# Patient Record
(demographics unavailable — no encounter records)

---

## 2024-11-27 NOTE — ASSESSMENT
[FreeTextEntry1] : Patient with primary hyperparathyroidism and osteopenia with worsening symptoms.  I have recommended a parathyroidectomy with intraoperative PTH monitoring.  I have requested a 4-dimensional CT scan to assist in preoperative localization. I have reviewed the pathophysiology of the disease process, the area anatomy and the rationale for surgery.  I discussed the risks, benefits and alternative treatments which include but are not limited to bleeding, infection, numbness, hoarseness, hypocalcemia, scarring, and need for reoperation.  I have answered the patient's questions to their satisfaction.  The patient wishes to proceed with the recommended procedure possibly during February.  They will contact my office to schedule surgery.

## 2024-11-27 NOTE — HISTORY OF PRESENT ILLNESS
[de-identified] : Patient referred by Dr. Martinez for evaluation of primary hyperparathyroidism.  9 months ago patient noted to have elevated calcium.  Reports reflux with increased urination, constipation, fatigue, increased forgetfulness, heat intolerance, anxiety, joint pain and voice fatigue.  Patient denies kidney stones, hypertension, dysphagia, radiation exposure.  Calcium 11, , creatinine 0.9, TSH 0.09, free T4 1.6, 24-hour urine calcium 217.  Bone density January 2023: Spine T1.4, hip T- 1.5.  Sestamibi November 2024: Activity left lower thyroid gland.  Thyroid ultrasound November 2024: No thyroid nodules, no parathyroid visualized. I have reviewed all old and new data and available images.

## 2024-11-27 NOTE — PHYSICAL EXAM
[de-identified] : no cervical or supraclavicular adenopathy, trachea midline, thyroid without enlargement or palpable mass [Normal] : no neck adenopathy [de-identified] : Skin:  normal appearance.  no rash, nodules, vesicles, or erythema, Musculoskeletal:  full range of motion and no deformities appreciated Neurological:  grossly intact Psychiatric:  oriented to person, place and time with appropriate affect

## 2024-11-27 NOTE — CONSULT LETTER
[Dear  ___] : Dear  [unfilled], [Consult Letter:] : I had the pleasure of evaluating your patient, [unfilled]. [Please see my note below.] : Please see my note below. [Consult Closing:] : Thank you very much for allowing me to participate in the care of this patient.  If you have any questions, please do not hesitate to contact me. [Sincerely,] : Sincerely, [FreeTextEntry3] : Shannan Lange MD, FACS Assistant Professor of Surgery and Otolaryngology [facility] [DrCrystal  ___] : Dr. SALAZAR

## 2025-03-20 NOTE — HISTORY OF PRESENT ILLNESS
[de-identified] : Patient referred by Dr. Martinez for evaluation of primary hyperparathyroidism.  9 months ago patient noted to have elevated calcium.  Reports reflux with increased urination, constipation, fatigue, increased forgetfulness, heat intolerance, anxiety, joint pain and voice fatigue.  Patient denies kidney stones, hypertension, dysphagia, radiation exposure.  Calcium 11, , creatinine 0.9, TSH 0.09, free T4 1.6, 24-hour urine calcium 217.  Bone density January 2023: Spine T1.4, hip T- 1.5.  Sestamibi November 2024: Activity left lower thyroid gland.  Thyroid ultrasound November 2024: No thyroid nodules, no parathyroid visualized.  3/12/25 left superior parathyroidectomy.  path benign, Patient denies dysphagia, hoarseness, pain or parathesias.I have reviewed all old and new data and available images.

## 2025-03-20 NOTE — PHYSICAL EXAM
[de-identified] : Incision healing with min swelling, scar min discussed. no cervical or supraclavicular adenopathy, trachea midline, thyroid without enlargement or palpable mass [Normal] : no neck adenopathy [de-identified] : Skin:  normal appearance.  no rash, nodules, vesicles, or erythema, Musculoskeletal:  full range of motion and no deformities appreciated Neurological:  grossly intact Psychiatric:  oriented to person, place and time with appropriate affect

## 2025-03-20 NOTE — ASSESSMENT
[FreeTextEntry1] : Patient with primary hyperparathyroidism and osteopenia with worsening symptoms.  Patient doing well postop.  Blood work sent.  Patient will decrease calcium to 1000 daily.  RTO 6 weeks. I have answered their questions to the best of my ability.

## 2025-03-27 NOTE — HISTORY OF PRESENT ILLNESS
[de-identified] : Patient referred by Dr. Martinez for evaluation of primary hyperparathyroidism.  9 months ago patient noted to have elevated calcium.  Reports reflux with increased urination, constipation, fatigue, increased forgetfulness, heat intolerance, anxiety, joint pain and voice fatigue.  Patient denies kidney stones, hypertension, dysphagia, radiation exposure.  Calcium 11, , creatinine 0.9, TSH 0.09, free T4 1.6, 24-hour urine calcium 217.  Bone density January 2023: Spine T1.4, hip T- 1.5.  Sestamibi November 2024: Activity left lower thyroid gland.  Thyroid ultrasound November 2024: No thyroid nodules, no parathyroid visualized.  3/12/25 left superior parathyroidectomy.  path benign, Patient denies dysphagia, hoarseness, pain or parathesias.  Patient returns complaining of tightness of her incision.  and numbness over left mandible. I have reviewed all old and new data and available images.

## 2025-03-27 NOTE — PHYSICAL EXAM
[de-identified] : Incision healing well, no sign of infection. scar min discussed. no cervical or supraclavicular adenopathy, trachea midline, thyroid without enlargement or palpable mass [Normal] : no neck adenopathy [de-identified] : Skin:  normal appearance.  no rash, nodules, vesicles, or erythema, Musculoskeletal:  full range of motion and no deformities appreciated Neurological:  grossly intact Psychiatric:  oriented to person, place and time with appropriate affect

## 2025-03-27 NOTE — ASSESSMENT
[FreeTextEntry1] : Patient with primary hyperparathyroidism and osteopenia with worsening symptoms.  Patient doing well postop.  Return prior to schedule appointment with tightness at incision and numbness over left jaw.  No suspicious findings on physical examination.  Appropriate healing of incision.  Recommend patient see dentist regarding jaw symptoms.  Blood work sent.  Patient will contact office to review results and adjust as needed..  RTO 6 weeks. I have answered their questions to the best of my ability.

## 2025-04-17 NOTE — PHYSICAL EXAM
[de-identified] : Incision healing well, no sign of infection. scar min discussed. no cervical or supraclavicular adenopathy, trachea midline, thyroid without enlargement or palpable mass [Normal] : no neck adenopathy [de-identified] : Skin:  normal appearance.  no rash, nodules, vesicles, or erythema, Musculoskeletal:  full range of motion and no deformities appreciated Neurological:  grossly intact Psychiatric:  oriented to person, place and time with appropriate affect

## 2025-04-17 NOTE — HISTORY OF PRESENT ILLNESS
[de-identified] : Patient referred by Dr. Martinez for evaluation of primary hyperparathyroidism.  9 months ago patient noted to have elevated calcium.  Reports reflux with increased urination, constipation, fatigue, increased forgetfulness, heat intolerance, anxiety, joint pain and voice fatigue.  Patient denies kidney stones, hypertension, dysphagia, radiation exposure.  Calcium 11, , creatinine 0.9, TSH 0.09, free T4 1.6, 24-hour urine calcium 217.  Bone density January 2023: Spine T1.4, hip T- 1.5.  Sestamibi November 2024: Activity left lower thyroid gland.  Thyroid ultrasound November 2024: No thyroid nodules, no parathyroid visualized.  3/12/25 left superior parathyroidectomy.  path benign, Patient denies dysphagia, hoarseness, pain or parathesias.  Patient returns complaining of heart racing with shortness of breath and feeling extremely anxious.  Reports she went to urgent care who told her her TSH level was elevated.  Has not seen primary care for workup.. I have reviewed all old and new data and available images.

## 2025-04-17 NOTE — ASSESSMENT
[FreeTextEntry1] : Patient with primary hyperparathyroidism and osteopenia with worsening symptoms.  Patient doing well postop.  Return prior to schedule appointment with multiple complaints.  Blood work sent to assess thyroid function.  Patient instructed to see primary care physician for multiple complaints or go to the emergency room.  I will contact her with the blood results.  RTO 4 months.. I have answered their questions to the best of my ability.